# Patient Record
Sex: MALE | Race: ASIAN | NOT HISPANIC OR LATINO | URBAN - METROPOLITAN AREA
[De-identification: names, ages, dates, MRNs, and addresses within clinical notes are randomized per-mention and may not be internally consistent; named-entity substitution may affect disease eponyms.]

---

## 2020-09-21 ENCOUNTER — TELEPHONE ENCOUNTER (OUTPATIENT)
Dept: URBAN - METROPOLITAN AREA CLINIC 35 | Facility: CLINIC | Age: 65
End: 2020-09-21

## 2021-06-16 ENCOUNTER — OFFICE VISIT (OUTPATIENT)
Dept: URBAN - METROPOLITAN AREA CLINIC 35 | Facility: CLINIC | Age: 66
End: 2021-06-16

## 2021-06-16 NOTE — HPI-MIGRATED HPI
;     Colorectal Cancer Screening : 67 y/o male patient presents today for a consultation for a colorectal cancer screening. Patient admits this is his/her first colonoscopy due to age. ??   Patient currently admits __ bowel movements per day with no melena, mucus  or blood in stools.  Patient denies/admits abdominal pain, bloating, gas, or  heartburn.  Patient denies having a colonoscopy/EGD in --- by  ---- with --  findings.  Patient denies/admits a family hx of colon, gastric, or esophageal cancer/polyps. ;